# Patient Record
Sex: FEMALE | Race: BLACK OR AFRICAN AMERICAN | NOT HISPANIC OR LATINO | Employment: OTHER | URBAN - METROPOLITAN AREA
[De-identification: names, ages, dates, MRNs, and addresses within clinical notes are randomized per-mention and may not be internally consistent; named-entity substitution may affect disease eponyms.]

---

## 2024-01-19 ENCOUNTER — TELEMEDICINE (OUTPATIENT)
Dept: RHEUMATOLOGY | Facility: CLINIC | Age: 64
End: 2024-01-19
Payer: COMMERCIAL

## 2024-01-19 ENCOUNTER — APPOINTMENT (OUTPATIENT)
Dept: RHEUMATOLOGY | Facility: CLINIC | Age: 64
End: 2024-01-19
Payer: COMMERCIAL

## 2024-01-19 DIAGNOSIS — M32.9 SLE (SYSTEMIC LUPUS ERYTHEMATOSUS RELATED SYNDROME) (MULTI): Primary | ICD-10-CM

## 2024-01-19 DIAGNOSIS — M32.8 OTHER FORMS OF SYSTEMIC LUPUS ERYTHEMATOSUS, UNSPECIFIED ORGAN INVOLVEMENT STATUS (MULTI): Primary | ICD-10-CM

## 2024-01-19 PROCEDURE — 99212 OFFICE O/P EST SF 10 MIN: CPT | Performed by: INTERNAL MEDICINE

## 2024-01-19 RX ORDER — ASPIRIN 81 MG/1
81 TABLET ORAL
COMMUNITY
Start: 2014-09-29

## 2024-01-19 RX ORDER — LIOTHYRONINE SODIUM 5 UG/1
2.5 TABLET ORAL
COMMUNITY
Start: 2013-03-26

## 2024-01-19 RX ORDER — TRAZODONE HYDROCHLORIDE 50 MG/1
1 TABLET ORAL NIGHTLY
COMMUNITY
Start: 2018-02-22

## 2024-01-19 RX ORDER — BENZOIN
TINCTURE TOPICAL
COMMUNITY
Start: 2023-03-09

## 2024-01-19 RX ORDER — KETOROLAC TROMETHAMINE 5 MG/ML
SOLUTION OPHTHALMIC
COMMUNITY
Start: 2023-12-12

## 2024-01-19 RX ORDER — FERROUS SULFATE, DRIED 160(50) MG
TABLET, EXTENDED RELEASE ORAL
COMMUNITY
Start: 2023-05-19

## 2024-01-19 RX ORDER — ONDANSETRON 4 MG/1
TABLET, FILM COATED ORAL
COMMUNITY
Start: 2023-07-29

## 2024-01-19 RX ORDER — HYDROXYCHLOROQUINE SULFATE 200 MG/1
200 TABLET, FILM COATED ORAL DAILY
Qty: 90 TABLET | Refills: 3 | Status: SHIPPED | OUTPATIENT
Start: 2024-01-19 | End: 2025-01-18

## 2024-01-19 RX ORDER — OXYCODONE AND ACETAMINOPHEN 5; 325 MG/1; MG/1
TABLET ORAL
COMMUNITY
Start: 2023-08-09

## 2024-01-19 RX ORDER — CLOPIDOGREL BISULFATE 75 MG/1
75 TABLET ORAL
COMMUNITY
Start: 2023-11-10 | End: 2024-11-09

## 2024-01-19 RX ORDER — APIXABAN 5 MG/1
1 TABLET, FILM COATED ORAL 2 TIMES DAILY
COMMUNITY
Start: 2018-01-10

## 2024-01-19 RX ORDER — MULTIVITAMIN
1 TABLET ORAL DAILY
COMMUNITY

## 2024-01-19 RX ORDER — PREDNISOLONE ACETATE 10 MG/ML
SUSPENSION/ DROPS OPHTHALMIC
COMMUNITY
Start: 2023-12-12

## 2024-01-19 RX ORDER — LEVOTHYROXINE SODIUM 50 UG/1
50 TABLET ORAL
COMMUNITY
Start: 2017-10-12

## 2024-01-19 RX ORDER — DORZOLAMIDE HCL 20 MG/ML
2 SOLUTION/ DROPS OPHTHALMIC
COMMUNITY
Start: 2016-01-18

## 2024-01-19 RX ORDER — LATANOPROST 50 UG/ML
SOLUTION/ DROPS OPHTHALMIC
COMMUNITY
Start: 2021-08-23

## 2024-01-19 RX ORDER — CYCLOBENZAPRINE HCL 10 MG
10 TABLET ORAL NIGHTLY PRN
COMMUNITY
Start: 2023-11-06

## 2024-01-19 RX ORDER — GABAPENTIN 400 MG/1
400 CAPSULE ORAL 3 TIMES DAILY
COMMUNITY
Start: 2021-07-16 | End: 2024-05-29

## 2024-01-19 RX ORDER — HYDROXYCHLOROQUINE SULFATE 200 MG/1
200 TABLET, FILM COATED ORAL
COMMUNITY
End: 2024-01-19 | Stop reason: SDUPTHER

## 2024-01-19 RX ORDER — PILOCARPINE HYDROCHLORIDE 10 MG/ML
SOLUTION/ DROPS OPHTHALMIC
COMMUNITY
Start: 2023-11-16

## 2024-01-19 RX ORDER — LORATADINE 10 MG/1
TABLET ORAL
COMMUNITY
Start: 2023-03-09

## 2024-01-19 RX ORDER — ACETAMINOPHEN 500 MG
5000 TABLET ORAL
COMMUNITY

## 2024-01-19 RX ORDER — BENZONATATE 100 MG/1
CAPSULE ORAL
COMMUNITY
Start: 2023-11-07

## 2024-01-19 RX ORDER — BRIMONIDINE TARTRATE 1.5 MG/ML
1 SOLUTION/ DROPS OPHTHALMIC 2 TIMES DAILY
COMMUNITY
Start: 2016-05-05

## 2024-01-19 RX ORDER — ROSUVASTATIN CALCIUM 10 MG/1
1 TABLET, COATED ORAL NIGHTLY
COMMUNITY
Start: 2022-03-02

## 2024-01-19 RX ORDER — FERROUS SULFATE TAB 325 MG (65 MG ELEMENTAL FE) 325 (65 FE) MG
1 TAB ORAL 3 TIMES WEEKLY
COMMUNITY
Start: 2024-01-04

## 2024-01-19 NOTE — PROGRESS NOTES
Telephone office visit     She complains of some mild discomfort in her hands.  She noted pinched nerve sensation in the right scapular region recurring on the evening prior to presentation.  She has not noted any lupus symptoms.  She has not had any seizures.  She is status post bilateral cataract and glaucoma surgery.  She had ultrasound of the lower extremities 1 day prior to presentation.  The results of which are not available at this time.  She is planning to follow-up with the vascular surgeon.  She has a good appetite.     Laboratory (9/7/2023) C4 37, C3 134, CRP <5.0, TSH 2.416, free T4 114, BUN 17, creatinine 1.07, glucose 87, WBC 3.9, hemoglobin 12.7, hematocrit 40.9, MCV 77.9, MCHC 31.0, platelets 207, AST 30, ALT 20, alkaline phosphatase 68, urinalysis leukocyte Estrace 25.     She has history of systemic lupus erythematosus appears to be asymptomatic.  She has history of seizures without any recurrent seizures, migraine headaches, peripheral artery disease status post left ileal femoral bypass grafting.     She is to continue hydroxychloroquine 200 mg once per day.  She is to have laboratory for SHARDA panel, C3, C4, CRP, and basic metabolic panel prior to next office visit.  She is to return in 4 to 6 months.     This visit was completed by audio technology due to the restriction of the Covid 19 pandemic.  All issues discussed and addressed as documented but no physical examination was performed.  Time spent 10 minutes with patient via audio technology and more than 50% of the time was spent in counseling and coordination of care.      no anaphylaxis/no respiratory distress

## 2024-01-19 NOTE — PROGRESS NOTES
Telephone office visit    She complains of some mild discomfort in her hands.  She noted pinched nerve sensation in the right scapular region recurring on the evening prior to presentation.  She has not noted any lupus symptoms.  She has not had any seizures.  She is status post bilateral cataract and glaucoma surgery.  She had ultrasound of the lower extremities 1 day prior to presentation.  The results of which are not available at this time.  She is planning to follow-up with the vascular surgeon.  She has a good appetite.    Laboratory (9/7/2023) C4 37, C3 134, CRP <5.0, TSH 2.416, free T4 114, BUN 17, creatinine 1.07, glucose 87, WBC 3.9, hemoglobin 12.7, hematocrit 40.9, MCV 77.9, MCHC 31.0, platelets 207, AST 30, ALT 20, alkaline phosphatase 68, urinalysis leukocyte Estrace 25.    She has history of systemic lupus erythematosus appears to be asymptomatic.  She has history of seizures without any recurrent seizures, migraine headaches, peripheral artery disease status post left ileal femoral bypass grafting.    She is to continue hydroxychloroquine 200 mg once per day.  She is to have laboratory for SHARDA panel, C3, C4, CRP, and basic metabolic panel prior to next office visit.  She is to return in 4 to 6 months.    This visit was completed by audio technology due to the restriction of the Covid 19 pandemic.  All issues discussed and addressed as documented but no physical examination was performed.  Time spent 10 minutes with patient via audio technology and more than 50% of the time was spent in counseling and coordination of care.

## 2024-06-11 ENCOUNTER — OFFICE VISIT (OUTPATIENT)
Dept: RHEUMATOLOGY | Facility: CLINIC | Age: 64
End: 2024-06-11
Payer: COMMERCIAL

## 2024-06-11 VITALS
HEIGHT: 61 IN | HEART RATE: 67 BPM | DIASTOLIC BLOOD PRESSURE: 68 MMHG | BODY MASS INDEX: 28.02 KG/M2 | TEMPERATURE: 97.3 F | SYSTOLIC BLOOD PRESSURE: 130 MMHG | WEIGHT: 148.4 LBS

## 2024-06-11 DIAGNOSIS — M25.50 ARTHRALGIA, UNSPECIFIED JOINT: Primary | ICD-10-CM

## 2024-06-11 PROCEDURE — 1159F MED LIST DOCD IN RCRD: CPT | Performed by: INTERNAL MEDICINE

## 2024-06-11 PROCEDURE — 1126F AMNT PAIN NOTED NONE PRSNT: CPT | Performed by: INTERNAL MEDICINE

## 2024-06-11 PROCEDURE — 1036F TOBACCO NON-USER: CPT | Performed by: INTERNAL MEDICINE

## 2024-06-11 PROCEDURE — 99213 OFFICE O/P EST LOW 20 MIN: CPT | Performed by: INTERNAL MEDICINE

## 2024-06-11 RX ORDER — LANOLIN ALCOHOL/MO/W.PET/CERES
500 CREAM (GRAM) TOPICAL DAILY
COMMUNITY

## 2024-06-11 RX ORDER — VITAMIN B COMPLEX
1 CAPSULE ORAL DAILY
COMMUNITY

## 2024-06-11 RX ORDER — FLAXSEED OIL 1000 MG
1000 CAPSULE ORAL DAILY
COMMUNITY

## 2024-06-11 ASSESSMENT — PAIN SCALES - GENERAL: PAINLEVEL: 0-NO PAIN

## 2024-06-11 NOTE — PROGRESS NOTES
She presents for follow-up evaluation with complaints of soreness involving the PIP and MCP joints of the digits of both hands generally occurring while lying in bed at night.  She has not noted any significant joint swelling.  She continues to take hydroxychloroquine 200 mg daily.  She has been up-to-date with ophthalmology evaluations.  She underwent cataract surgery in 12/2023 on both eyes.  She is planning to start physical therapy for radicular pain in the left upper extremity.    The lungs, heart, and abdomen, and extremities are benign.  The musculoskeletal examination does not show any joint effusions.  There is preserved passive range of motion of the upper and lower extremity joints.  There is no tenderness to palpation over the upper or lower extremity joints.  Straight leg raise test is normal bilaterally.  There is a faint right dorsalis pedis pulse.  1+ right popliteal pulse.    Systemic lupus erythematosus appears to be under good control and is asymptomatic at present and perhaps some musculoskeletal pain involving the PIP and MCP joints of digits of both hands.  There is no muscle atrophy.    Laboratory results not available currently.    She has systemic lupus erythematosus that is stable, history of epilepsy that is asymptomatic, migraine headaches, peripheral artery disease status post left iliofemoral bypass grafting.    She is to continue hydroxychloroquine 200 mg once per day.  She is to do stretching exercises to the neck and shoulders.  She is to return at the next available office appointment.

## 2024-06-17 ENCOUNTER — TELEPHONE (OUTPATIENT)
Dept: RHEUMATOLOGY | Facility: CLINIC | Age: 64
End: 2024-06-17
Payer: COMMERCIAL

## 2024-06-17 NOTE — TELEPHONE ENCOUNTER
Call placed to pt to discuss recently resulted labs. This nurse made pt aware that labs were all within normal range. Pt was happy to have return call placed. Nothing further to address.

## 2024-07-11 ENCOUNTER — TELEPHONE (OUTPATIENT)
Dept: RHEUMATOLOGY | Facility: CLINIC | Age: 64
End: 2024-07-11

## 2024-07-11 DIAGNOSIS — M32.9 SLE (SYSTEMIC LUPUS ERYTHEMATOSUS RELATED SYNDROME) (MULTI): ICD-10-CM

## 2024-07-11 RX ORDER — HYDROXYCHLOROQUINE SULFATE 200 MG/1
200 TABLET, FILM COATED ORAL DAILY
Qty: 90 TABLET | Refills: 3 | Status: SHIPPED | OUTPATIENT
Start: 2024-07-11 | End: 2025-07-11

## 2024-11-22 ENCOUNTER — APPOINTMENT (OUTPATIENT)
Dept: RHEUMATOLOGY | Facility: CLINIC | Age: 64
End: 2024-11-22
Payer: COMMERCIAL

## 2025-03-04 ENCOUNTER — APPOINTMENT (OUTPATIENT)
Dept: RHEUMATOLOGY | Facility: CLINIC | Age: 65
End: 2025-03-04
Payer: MEDICARE

## 2025-03-04 VITALS
BODY MASS INDEX: 28.32 KG/M2 | SYSTOLIC BLOOD PRESSURE: 150 MMHG | WEIGHT: 150 LBS | OXYGEN SATURATION: 98 % | HEIGHT: 61 IN | DIASTOLIC BLOOD PRESSURE: 80 MMHG | HEART RATE: 76 BPM | TEMPERATURE: 97.3 F

## 2025-03-04 DIAGNOSIS — M32.8 OTHER FORMS OF SYSTEMIC LUPUS ERYTHEMATOSUS, UNSPECIFIED ORGAN INVOLVEMENT STATUS (MULTI): Primary | ICD-10-CM

## 2025-03-04 PROCEDURE — 1126F AMNT PAIN NOTED NONE PRSNT: CPT | Performed by: INTERNAL MEDICINE

## 2025-03-04 PROCEDURE — 3008F BODY MASS INDEX DOCD: CPT | Performed by: INTERNAL MEDICINE

## 2025-03-04 PROCEDURE — 1160F RVW MEDS BY RX/DR IN RCRD: CPT | Performed by: INTERNAL MEDICINE

## 2025-03-04 PROCEDURE — 1159F MED LIST DOCD IN RCRD: CPT | Performed by: INTERNAL MEDICINE

## 2025-03-04 PROCEDURE — 99213 OFFICE O/P EST LOW 20 MIN: CPT | Performed by: INTERNAL MEDICINE

## 2025-03-04 PROCEDURE — 1036F TOBACCO NON-USER: CPT | Performed by: INTERNAL MEDICINE

## 2025-03-04 ASSESSMENT — PAIN SCALES - GENERAL: PAINLEVEL_OUTOF10: 0-NO PAIN

## 2025-03-04 NOTE — PROGRESS NOTES
She presents for follow-up evaluation without any significant musculoskeletal complaints currently.  She does note some discomfort intermittently involving her hands and left shoulder.  She has not had any significant claudication symptoms in the lower extremities.  She has not had any recent seizures.  She has not noted any mucosal ulcerations, rashes, or significant joint swelling.  She continues to take hydroxychloroquine 200 mg 5 days/week.    The lungs are clear to auscultation.  The heart has regular rate and rhythm with normal heart sounds.  The abdomen is benign.  The extremities are without edema.  The right dorsalis pedis pulse is trace and left dorsalis pedis pulses 1+.  There are no digital ulcers or cyanosis.  The musculoskeletal examination shows good passive range of motion of the upper and lower extremity joints without joint effusions.    DEXA bone density (10/3/2024) L1-L4 T-score 0.0, left femoral neck T-score -0.4, left total femur T-score -0.2.    The systemic lupus erythematosus appears to be asymptomatic.  She has history of epilepsy that has been asymptomatic.  She has a history of migraine headaches and peripheral artery disease status post left iliofemoral bypass grafting.    She is to continue hydroxychloroquine 200 mg 5 days/week.  She is to have laboratory for C3, C4, SHARDA panel, urinalysis, and urine protein/creatinine ratio with her next blood draw.  She is to return at the next available office appointment.

## 2025-05-02 DIAGNOSIS — M32.9 SLE (SYSTEMIC LUPUS ERYTHEMATOSUS RELATED SYNDROME) (MULTI): ICD-10-CM

## 2025-05-06 RX ORDER — HYDROXYCHLOROQUINE SULFATE 200 MG/1
TABLET, FILM COATED ORAL DAILY
Qty: 90 TABLET | Refills: 3 | Status: SHIPPED | OUTPATIENT
Start: 2025-05-06

## 2025-05-07 LAB
ANA PAT SER IF-IMP: ABNORMAL
ANA SER QL IF: POSITIVE
ANA TITR SER IF: ABNORMAL TITER
APPEARANCE UR: CLEAR
BACTERIA #/AREA URNS HPF: ABNORMAL /HPF
BILIRUB UR QL STRIP: NEGATIVE
C3 SERPL-MCNC: 134 MG/DL (ref 83–193)
C4 SERPL-MCNC: 28 MG/DL (ref 15–57)
CENTROMERE B AB SER-ACNC: ABNORMAL AI
COLOR UR: YELLOW
CREAT UR-MCNC: 87 MG/DL (ref 20–275)
DSDNA AB SER-ACNC: <1 IU/ML
ENA JO1 AB SER IA-ACNC: ABNORMAL AI
ENA RNP AB SER-ACNC: ABNORMAL AI
ENA SCL70 AB SER IA-ACNC: ABNORMAL AI
ENA SM AB SER IA-ACNC: ABNORMAL AI
ENA SM+RNP AB SER IA-ACNC: ABNORMAL AI
ENA SS-A AB SER IA-ACNC: ABNORMAL AI
ENA SS-B AB SER IA-ACNC: ABNORMAL AI
GLUCOSE UR QL STRIP: NEGATIVE
HGB UR QL STRIP: NEGATIVE
HYALINE CASTS #/AREA URNS LPF: ABNORMAL /LPF
KETONES UR QL STRIP: NEGATIVE
LABORATORY COMMENT REPORT: ABNORMAL
LEUKOCYTE ESTERASE UR QL STRIP: ABNORMAL
NITRITE UR QL STRIP: NEGATIVE
NUCLEOSOME AB SER IA-ACNC: ABNORMAL AI
PH UR STRIP: 5.5 [PH] (ref 5–8)
PROT UR QL STRIP: NEGATIVE
PROT UR-MCNC: 6 MG/DL (ref 5–24)
PROT/CREAT UR: 0.07 MG/MG CREAT (ref 0.02–0.18)
PROT/CREAT UR: 69 MG/G CREAT (ref 24–184)
RBC #/AREA URNS HPF: ABNORMAL /HPF
RIBOSOMAL P AB SER-ACNC: ABNORMAL AI
SERVICE CMNT-IMP: ABNORMAL
SP GR UR STRIP: 1.01 (ref 1–1.03)
SQUAMOUS #/AREA URNS HPF: ABNORMAL /HPF
WBC #/AREA URNS HPF: ABNORMAL /HPF

## 2025-09-23 ENCOUNTER — APPOINTMENT (OUTPATIENT)
Dept: RHEUMATOLOGY | Facility: CLINIC | Age: 65
End: 2025-09-23
Payer: MEDICARE

## 2025-11-06 ENCOUNTER — APPOINTMENT (OUTPATIENT)
Dept: DERMATOLOGY | Facility: CLINIC | Age: 65
End: 2025-11-06
Payer: COMMERCIAL